# Patient Record
Sex: FEMALE
[De-identification: names, ages, dates, MRNs, and addresses within clinical notes are randomized per-mention and may not be internally consistent; named-entity substitution may affect disease eponyms.]

---

## 2017-10-20 NOTE — RAD
CHEST TWO VIEWS:

10/20/17

 

HISTORY: 

Preop.

 

COMPARISON:  

6/11/15 study.

 

The heart size and mediastinum are within normal limits. The lungs are clear of infiltrates. No sign
ificant bony findings.

 

IMPRESSION:  

No active intrathoracic disease. 

 

POS: [facility]

## 2017-10-26 NOTE — EKG
Test Reason : PREOP

Blood Pressure : ***/*** mmHG

Vent. Rate : 074 BPM     Atrial Rate : 074 BPM

   P-R Int : 136 ms          QRS Dur : 094 ms

    QT Int : 362 ms       P-R-T Axes : 043 -03 014 degrees

   QTc Int : 401 ms

 

Normal sinus rhythm with sinus arrhythmia

Normal ECG

No previous ECGs available

Confirmed by DR. ANUJ CHAUDHARY (3) on 10/26/2017 12:19:55 PM

 

Referred By:  MOISES           Confirmed By:DR. ANUJ CHAUDHARY

## 2017-10-27 NOTE — XMS
Clinical Summary

 Created on:2017



Patient:Mini Nolasco

Sex:Female

:1985

External Reference #:[id number]





Demographics







 Address  [address] [place] [address]

 

 Home Phone  [phone]

 

 Email Address  [email or web address]

 

 Preferred Language  English

 

 Marital Status  Single

 

 Church Affiliation  Unknown

 

 Race  White

 

 Ethnic Group  Unknown









Author







 Organization  [facility]

 

 Address  [address] [place] [address]

 

 Phone  [phone]









Care Team Providers







 Name  Role  Phone

 

 ,  Primary Care Provider  Unavailable









Allergies

Not on File



Current Medications

Not on file



Active Problems

Not on file



Social History







 Tobacco Use  Types  Packs/Day  Years Used  Date

 

 Never Assessed        









 Sex Assigned at Birth  Date Recorded

 

 Not on file  







Last Filed Vital Signs

Not on file



Plan of Treatment

Not on file



Results

Not on filefrom Last 3 Months

## 2017-10-27 NOTE — RAD
LIMITED UPPER GI WITH 15 ML GASTROGRAFIN:

 

Date:  10/27/17 

 

FINDINGS:

Recent vertical sleeve gastrectomy. 

 

FINDINGS:

There is prompt passage of contrast from the esophagus into the stomach and duodenum. No contrast ex
travasation is seen. 

 

IMPRESSION: 

No evidence of obstruction or leak. 

 

 

POS: [facility]

## 2017-10-30 NOTE — OP
DATE OF PROCEDURE:  10/26/2017

 

PREOPERATIVE DIAGNOSIS:  Morbid obesity with a BMI of 37.

 

POSTOPERATIVE DIAGNOSIS:  Morbid obesity with a BMI of 37.

 

PROCEDURES PERFORMED:

1.  Laparoscopic sleeve gastrectomy.

2.  Esophagogastroduodenoscopy with [place] staple line reinforcements and 38 [place] bougie.

 

SURGEON:  Dr. Nam.

 

ANESTHESIA:  General.

 

ESTIMATED BLOOD LOSS:  20 mL

 

COMPLICATIONS:  None.

 

SPECIMEN:  Stomach.

 

FINDINGS:  Normal postoperative esophagogastroduodenoscopy.

 

INDICATION:  The patient is a 32-year-old female who presents for weight loss surgery.  She has atte
nded our preoperative seminar and has had her preoperative education.  She understands risks, benefi
ts, alternatives to surgery as well as alternatives to weight loss procedures.  She gives consent.

 

TECHNIQUE:  The patient was taken to the operating room and placed on supine table.  After general a
nesthetic was obtained, arms and legs were double strapped to bariatric table.  Her abdomen were sha
zoe, prepped and draped in a sterile fashion.  OG tube had been used to decompress the stomach.  Lef
t subcostal 5-mm Optiview trocar was placed in the usual fashion without injury and High-flow pneumo
peritoneum was obtained.  Left and right abdominal 12-mm ports and a right subcostal 5-mm port were 
placed under direct visualization.  The patient was placed in reverse Trendelenburg position.  Natha
nson was brought in through a 5 mm incision made at the xiphoid and used to raise the liver off the 
GE junction.  There is no hiatal hernia.  The short gastrics were taken down from a distance of 5 cm
 proximal to the pylorus all the way up to the angle of His and left hubert of the diaphragm.  The pos
terior fundus, left hubert and angle of His is completely dissected as are all posterior gastric adhes
ions.  OG tube was removed and a 38 [place] bougie brought in and its tip left in the antrum of the s
tomach.  Multiple loads of an [place] stapling device with [place] staple line reinforcements used to f
orm the sleeve.  The first was fired up at a distance of 6 cm proximal to the pylorus angled up towa
rds the incisura.  Multiple loads were then fired up along the bougie, and the stomach was completel
y transected at the angle of His.  The stomach was removed from left abdominal incision.  This fasci
al defect was closed using GraNee needle and 0 Vicryl tie.  EGD scope was passed into the esophagus,
 stomach to the level of the duodenum without obstruction.  There was no stricture at the incisura. 
 There was no air leakage through the staple line.  There was no bleeding internally.  EGD scope was
 used to decompress the stomach, was pulled and removed.  The [place] was removed under direct vis
ualization without bleeding.  All port are removed under direct visualization without bleeding.  Pne
umoperitoneum was let down.  Vicryl was used to close the fascial defect from the left abdominal inc
isions.  All incisions were irrigated and closed using 4-0 Monocryl and Dermabond.  The patient was 
en route to recovery in stable condition.  All instrument counts, needle counts, and lap counts were
 correct.

## 2018-12-05 ENCOUNTER — HOSPITAL ENCOUNTER (OUTPATIENT)
Dept: HOSPITAL 92 - BICULT | Age: 33
Discharge: HOME | End: 2018-12-05
Attending: FAMILY MEDICINE
Payer: COMMERCIAL

## 2018-12-05 DIAGNOSIS — R10.9: Primary | ICD-10-CM

## 2018-12-05 PROCEDURE — 80048 BASIC METABOLIC PNL TOTAL CA: CPT

## 2018-12-05 PROCEDURE — 82306 VITAMIN D 25 HYDROXY: CPT

## 2018-12-05 PROCEDURE — 82607 VITAMIN B-12: CPT

## 2018-12-05 PROCEDURE — 76700 US EXAM ABDOM COMPLETE: CPT

## 2018-12-05 PROCEDURE — 82728 ASSAY OF FERRITIN: CPT

## 2018-12-05 PROCEDURE — 85025 COMPLETE CBC W/AUTO DIFF WBC: CPT

## 2018-12-05 PROCEDURE — 36415 COLL VENOUS BLD VENIPUNCTURE: CPT

## 2018-12-05 PROCEDURE — 84425 ASSAY OF VITAMIN B-1: CPT

## 2018-12-05 PROCEDURE — 83970 ASSAY OF PARATHORMONE: CPT

## 2018-12-05 NOTE — ULT
ABDOMINAL ULTRASOUND:

 

Date: 12-5-18 

 

History: Right upper quadrant abdominal pain and bloating. 

 

FINDINGS: 

The visualized portions of the IVC, abdominal aorta, visualized portions of the pancreas, liver, gall
bladder, spleen, and bilateral kidneys demonstrate a normal sonographic appearance. The right kidney 
measures 10.2 cm in length with the left kidney measuring 9.9 cm in length. Common duct measures 0.3 
cm in diameter which is within normal limits. 

 

IMPRESSION: 

Normal abdominal ultrasound examination. No gallbladder calculi are seen. 

 

POS: [facility]